# Patient Record
(demographics unavailable — no encounter records)

---

## 2024-11-06 NOTE — COUNSELING
[Fall prevention counseling provided] : Fall prevention counseling provided [Adequate lighting] : Adequate lighting [No throw rugs] : No throw rugs [Use proper foot wear] : Use proper foot wear [Use recommended devices] : Use recommended devices [Behavioral health counseling provided] : Behavioral health counseling provided [Sleep ___ hours/day] : Sleep [unfilled] hours/day [Engage in a relaxing activity] : Engage in a relaxing activity [Plan in advance] : Plan in advance [Potential consequences of obesity discussed] : Potential consequences of obesity discussed [Benefits of weight loss discussed] : Benefits of weight loss discussed [Structured Weight Management Program suggested:] : Structured weight management program suggested [Encouraged to maintain food diary] : Encouraged to maintain food diary [Encouraged to increase physical activity] : Encouraged to increase physical activity [Target Wt Loss Goal ___] : Weight Loss Goals: Target weight loss goal [unfilled] lbs [Weigh Self Weekly] : weigh self weekly [Decrease Portions] : decrease portions [____ min/wk Activity] : [unfilled] min/wk activity [None] : None [Good understanding] : Patient has a good understanding of lifestyle changes and steps needed to achieve self management goal [de-identified] : Total face-to-face time with patient - 20 minutes; >50% involved counselling, review of labs/tests, and/or coordination of medical care:  Medical Annual wellness visit completed: HRA completed and reviewed with patient Medical, family, surgical history reviewed with patient and updated List of current providers r/w patient and updated Vitals, BMI reviewed and discussed along with healthy BMI goals. Dietary counseling x 15 minutes provided Depression PHQ 9 completed and reviewed  Annual safety assessment reviewed discussed advanced directives smoking cessation counseling provided Established routine screening and immunization schedules Medical Annual wellness visit completed: HRA completed and reviewed with patient Medical, family, surgical history reviewed with patient and updated List of current providers r/w patient and updated Vitals, BMI reviewed and discussed along with healthy BMI goals. Dietary counseling x 15 minutes provided Depression PHQ 9 completed and reviewed  Annual safety assessment reviewed discussed advanced directives smoking cessation counseling provided Established routine screening and immunization schedules  VACCINATION & OTHER TX RECOMMENDATIONS  ASA preventative therapy Calcium/Vitamin D supplementation   Dietary counseling, nutrition referral risks vs. benefits d/w patient. routine vaccination and vaccination schedules and recommendation d/w patient  Vaccines recommended:  * pneumovax (once after 65) & prevnar * annual Influenza vaccine * Hep B vaccines * zostavax * Tdap  Colorectal screening recommended; screening colonoscopy q10yr, flex sig q5yr, annual fecal occult testing BMD recommended biennially for osteoporosis screening Glaucoma screening recommended, annual optho evals Cardiovascular screening and blood tests recommended and discussed w/ patient, cholesterol screening and dietary counseling AAA recommended x 1  DIABETIC recommendations: med nutrition counseling, nutrition referral  annual podiatry evaluations and follow up annual optho eval/retinal exams routine blood tets, including FBS, a1c% and cholesterol panels  Met with HANNAH CHAVEZ, who was willing to discuss advance care planning.  Our advance care planning conversation included a discussion about: 1. The value and importance of advance care planning. 2. Experiences with loved ones who have been seriously ill or have . 3. Exploration of personal, cultural, or spiritual beliefs that might influence medical decisions. 4. Exploration of goals of care in the event of a sudden injury or illness. 5. Identification of a health care agent. 6. Review and update, or completion of, an advance directive. Start time: ____________                    End time: ____________  Bp stable, continue with medications, dash diet, exercise, and dietary management.Continue to check home Bps.  Avoid sweets and sugary drinks, diet drinks or water preferred, minimize carbohydrates such as bread, rice, pasta and potatoes - Discussed diabetes physiology - Discussed importance of monitoring blood glucose levels - Encouraged a low fat/low cholesterol diet - Discussed symptoms of hyperglycemia and hypoglycemia - Discussed ADA glucose goals - Discussed  HGB A1c and the effects of blood glucose on the level - Discussed Healthy eating, avoidance of concentrated sweets, and to include vegetables by at least 2 meals a day - Discussed regular exercise - Discussed importance of follow up physician visits  Avoid tomato products, mint, citrus (drinks / fruit), fried fatty foods, caffeine, chocolate.Avoid food / drink ,2 hours prior to bedtime / napping:

## 2024-11-06 NOTE — PHYSICAL EXAM
[No Acute Distress] : no acute distress [Well Nourished] : well nourished [Well Developed] : well developed [Well-Appearing] : well-appearing [Normal Sclera/Conjunctiva] : normal sclera/conjunctiva [PERRL] : pupils equal round and reactive to light [EOMI] : extraocular movements intact [Normal Outer Ear/Nose] : the outer ears and nose were normal in appearance [Normal Oropharynx] : the oropharynx was normal [No JVD] : no jugular venous distention [No Lymphadenopathy] : no lymphadenopathy [Supple] : supple [Thyroid Normal, No Nodules] : the thyroid was normal and there were no nodules present [No Respiratory Distress] : no respiratory distress  [No Accessory Muscle Use] : no accessory muscle use [Clear to Auscultation] : lungs were clear to auscultation bilaterally [Normal Rate] : normal rate  [Regular Rhythm] : with a regular rhythm [Normal S1, S2] : normal S1 and S2 [No Murmur] : no murmur heard [No Carotid Bruits] : no carotid bruits [No Abdominal Bruit] : a ~M bruit was not heard ~T in the abdomen [No Varicosities] : no varicosities [Pedal Pulses Present] : the pedal pulses are present [No Edema] : there was no peripheral edema [No Palpable Aorta] : no palpable aorta [No Extremity Clubbing/Cyanosis] : no extremity clubbing/cyanosis [Declined Breast Exam] : declined breast exam  [Soft] : abdomen soft [Non Tender] : non-tender [Non-distended] : non-distended [No Masses] : no abdominal mass palpated [No HSM] : no HSM [Normal Bowel Sounds] : normal bowel sounds [Declined Rectal Exam] : declined rectal exam [Normal Posterior Cervical Nodes] : no posterior cervical lymphadenopathy [Normal Anterior Cervical Nodes] : no anterior cervical lymphadenopathy [No CVA Tenderness] : no CVA  tenderness [No Spinal Tenderness] : no spinal tenderness [No Joint Swelling] : no joint swelling [Grossly Normal Strength/Tone] : grossly normal strength/tone [No Rash] : no rash [Coordination Grossly Intact] : coordination grossly intact [No Focal Deficits] : no focal deficits [Normal Gait] : normal gait [Deep Tendon Reflexes (DTR)] : deep tendon reflexes were 2+ and symmetric [Normal Affect] : the affect was normal [Normal Insight/Judgement] : insight and judgment were intact [Normal] : affect was normal and insight and judgment were intact [de-identified] : BMI 28 [de-identified] : No do formational swelling noted in fingers but patient has inability to close his hands [de-identified] : Zoster scars noted on right posterior hemithorax

## 2024-11-06 NOTE — REASON FOR VISIT
[Annual Wellness Visit] : an annual wellness visit [FreeTextEntry1] : Annual wellness for this 76-year-old male

## 2024-11-06 NOTE — HISTORY OF PRESENT ILLNESS
[FreeTextEntry1] : 77-year-old male with history of COPD, elevated PSA GERD glucose intolerance hypertension history of herpes zoster which was prolonged for months..  Patient continues to use oxygen for COPD, desaturates when off of O2 with exertion.  Is stable at rest.  Patient is complaining of left hip pain.  Denies fever chills chest pain or palpitations [de-identified] : 77-year-old male presents himself today for an Annual Wellness,F/U and GHM. Hx of COPD, GERD, HTN  This 77-year-old male with COPD comes in for annual checkup.  Needs refills of medications.  Complaining of hip pain and knee pain. Voices no further complaints. ROS as documented below. Denies fever,cough,chills,body aches and SOB.

## 2024-11-06 NOTE — REVIEW OF SYSTEMS
[Negative] : Heme/Lymph [Dyspnea on Exertion] : dyspnea on exertion [Joint Pain] : joint pain [Joint Stiffness] : joint stiffness [Muscle Pain] : muscle pain [FreeTextEntry9] : Hip pain left

## 2024-11-06 NOTE — END OF VISIT
[FreeTextEntry4] : I Linda Mascorro, am scribing for and in the presence of Dr. Bland, the following sections of:  HISTORY OF PRESENT ILLNESS, PAST MEDICAL/FAMILY/SOCIAL HISTORY, ROS, VITALS, PE, DISPOSITION [Time Spent: ___ minutes] : I have spent [unfilled] minutes of time on the encounter which excludes teaching and separately reported services.

## 2024-11-06 NOTE — DATA REVIEWED
[FreeTextEntry1] : Rheumatoid factor negative C-reactive protein 6 sed rate 17.  A1c 5.7, PSA 4.84 EKG normal sinus rhythm.  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

## 2024-11-06 NOTE — HEALTH RISK ASSESSMENT
[Fair] :  ~his/her~ mood as fair [1 or 2 (0 pts)] : 1 or 2 (0 points) [Never (0 pts)] : Never (0 points) [No] : In the past 12 months have you used drugs other than those required for medical reasons? No [No falls in past year] : Patient reported no falls in the past year [0] : 2) Feeling down, depressed, or hopeless: Not at all (0) [PHQ-2 Negative - No further assessment needed] : PHQ-2 Negative - No further assessment needed [PHQ-9 Negative - No further assessment needed] : PHQ-9 Negative - No further assessment needed [Former] : Former [Patient reported colonoscopy was normal] : Patient reported colonoscopy was normal [None] : None [With Significant Other] : lives with significant other [# of Members in Household ___] :  household currently consist of [unfilled] member(s) [Retired] : retired [High School] : high school [] :  [# Of Children ___] : has [unfilled] children [Feels Safe at Home] : Feels safe at home [Fully functional (bathing, dressing, toileting, transferring, walking, feeding)] : Fully functional (bathing, dressing, toileting, transferring, walking, feeding) [Fully functional (using the telephone, shopping, preparing meals, housekeeping, doing laundry, using] : Fully functional and needs no help or supervision to perform IADLs (using the telephone, shopping, preparing meals, housekeeping, doing laundry, using transportation, managing medications and managing finances) [Reports normal functional visual acuity (ie: able to read med bottle)] : Reports normal functional visual acuity [Smoke Detector] : smoke detector [Carbon Monoxide Detector] : carbon monoxide detector [Safety elements used in home] : safety elements used in home [Seat Belt] :  uses seat belt [Sunscreen] : uses sunscreen [Designated Healthcare Proxy] : Designated healthcare proxy [Name: ___] : Health Care Proxy's Name: [unfilled]  [Relationship: ___] : Relationship: [unfilled] [Aggressive treatment] : aggressive treatment [I will adhere to the patient's wishes.] : I will adhere to the patient's wishes. [Time Spent: ___ minutes] : Time Spent: [unfilled] minutes [Audit-CScore] : 0 [de-identified] : walking  [de-identified] : everything, does not watch his diet  [de-identified] : PHQ9 5 minutes spent  [[id number]] : 0 [LowDoseCTScan] : 04/23 [EyeExamDate] : 10/22 [Change in mental status noted] : No change in mental status noted [Language] : denies difficulty with language [Behavior] : denies difficulty with behavior [Learning/Retaining New Information] : denies difficulty learning/retaining new information [Handling Complex Tasks] : denies difficulty handling complex tasks [Reasoning] : denies difficulty with reasoning [Sexually Active] : not sexually active [Reports changes in hearing] : Reports no changes in hearing [Reports changes in dental health] : Reports no changes in dental health [Reports changes in vision] : Reports no changes in vision [Travel to Developing Areas] : does not  travel to developing areas [TB Exposure] : is not being exposed to tuberculosis [ColonoscopyDate] : 10/13 [de-identified] : decrease in hearing  [AdvancecareDate] : 11/24 [FreeTextEntry4] : [phone]

## 2024-11-06 NOTE — HEALTH RISK ASSESSMENT
[Fair] :  ~his/her~ mood as fair [1 or 2 (0 pts)] : 1 or 2 (0 points) [Never (0 pts)] : Never (0 points) [No] : In the past 12 months have you used drugs other than those required for medical reasons? No [No falls in past year] : Patient reported no falls in the past year [0] : 2) Feeling down, depressed, or hopeless: Not at all (0) [PHQ-2 Negative - No further assessment needed] : PHQ-2 Negative - No further assessment needed [PHQ-9 Negative - No further assessment needed] : PHQ-9 Negative - No further assessment needed [Former] : Former [Patient reported colonoscopy was normal] : Patient reported colonoscopy was normal [None] : None [With Significant Other] : lives with significant other [# of Members in Household ___] :  household currently consist of [unfilled] member(s) [Retired] : retired [High School] : high school [] :  [# Of Children ___] : has [unfilled] children [Feels Safe at Home] : Feels safe at home [Fully functional (bathing, dressing, toileting, transferring, walking, feeding)] : Fully functional (bathing, dressing, toileting, transferring, walking, feeding) [Fully functional (using the telephone, shopping, preparing meals, housekeeping, doing laundry, using] : Fully functional and needs no help or supervision to perform IADLs (using the telephone, shopping, preparing meals, housekeeping, doing laundry, using transportation, managing medications and managing finances) [Reports normal functional visual acuity (ie: able to read med bottle)] : Reports normal functional visual acuity [Smoke Detector] : smoke detector [Carbon Monoxide Detector] : carbon monoxide detector [Safety elements used in home] : safety elements used in home [Seat Belt] :  uses seat belt [Sunscreen] : uses sunscreen [Designated Healthcare Proxy] : Designated healthcare proxy [Name: ___] : Health Care Proxy's Name: [unfilled]  [Relationship: ___] : Relationship: [unfilled] [Aggressive treatment] : aggressive treatment [I will adhere to the patient's wishes.] : I will adhere to the patient's wishes. [Time Spent: ___ minutes] : Time Spent: [unfilled] minutes [Audit-CScore] : 0 [de-identified] : walking  [de-identified] : everything, does not watch his diet  [de-identified] : PHQ9 5 minutes spent  [CLI1Vixue] : 0 [LowDoseCTScan] : 04/23 [EyeExamDate] : 10/22 [Change in mental status noted] : No change in mental status noted [Language] : denies difficulty with language [Behavior] : denies difficulty with behavior [Learning/Retaining New Information] : denies difficulty learning/retaining new information [Handling Complex Tasks] : denies difficulty handling complex tasks [Reasoning] : denies difficulty with reasoning [Sexually Active] : not sexually active [Reports changes in hearing] : Reports no changes in hearing [Reports changes in dental health] : Reports no changes in dental health [Reports changes in vision] : Reports no changes in vision [Travel to Developing Areas] : does not  travel to developing areas [TB Exposure] : is not being exposed to tuberculosis [ColonoscopyDate] : 10/13 [de-identified] : decrease in hearing  [AdvancecareDate] : 11/24 [FreeTextEntry4] : [phone]

## 2024-11-06 NOTE — COUNSELING
[Fall prevention counseling provided] : Fall prevention counseling provided [Adequate lighting] : Adequate lighting [No throw rugs] : No throw rugs [Use proper foot wear] : Use proper foot wear [Use recommended devices] : Use recommended devices [Behavioral health counseling provided] : Behavioral health counseling provided [Sleep ___ hours/day] : Sleep [unfilled] hours/day [Engage in a relaxing activity] : Engage in a relaxing activity [Plan in advance] : Plan in advance [Potential consequences of obesity discussed] : Potential consequences of obesity discussed [Benefits of weight loss discussed] : Benefits of weight loss discussed [Structured Weight Management Program suggested:] : Structured weight management program suggested [Encouraged to maintain food diary] : Encouraged to maintain food diary [Encouraged to increase physical activity] : Encouraged to increase physical activity [Target Wt Loss Goal ___] : Weight Loss Goals: Target weight loss goal [unfilled] lbs [Weigh Self Weekly] : weigh self weekly [Decrease Portions] : decrease portions [____ min/wk Activity] : [unfilled] min/wk activity [None] : None [Good understanding] : Patient has a good understanding of lifestyle changes and steps needed to achieve self management goal [de-identified] : Total face-to-face time with patient - 20 minutes; >50% involved counselling, review of labs/tests, and/or coordination of medical care:  Medical Annual wellness visit completed: HRA completed and reviewed with patient Medical, family, surgical history reviewed with patient and updated List of current providers r/w patient and updated Vitals, BMI reviewed and discussed along with healthy BMI goals. Dietary counseling x 15 minutes provided Depression PHQ 9 completed and reviewed  Annual safety assessment reviewed discussed advanced directives smoking cessation counseling provided Established routine screening and immunization schedules Medical Annual wellness visit completed: HRA completed and reviewed with patient Medical, family, surgical history reviewed with patient and updated List of current providers r/w patient and updated Vitals, BMI reviewed and discussed along with healthy BMI goals. Dietary counseling x 15 minutes provided Depression PHQ 9 completed and reviewed  Annual safety assessment reviewed discussed advanced directives smoking cessation counseling provided Established routine screening and immunization schedules  VACCINATION & OTHER TX RECOMMENDATIONS  ASA preventative therapy Calcium/Vitamin D supplementation   Dietary counseling, nutrition referral risks vs. benefits d/w patient. routine vaccination and vaccination schedules and recommendation d/w patient  Vaccines recommended:  * pneumovax (once after 65) & prevnar * annual Influenza vaccine * Hep B vaccines * zostavax * Tdap  Colorectal screening recommended; screening colonoscopy q10yr, flex sig q5yr, annual fecal occult testing BMD recommended biennially for osteoporosis screening Glaucoma screening recommended, annual optho evals Cardiovascular screening and blood tests recommended and discussed w/ patient, cholesterol screening and dietary counseling AAA recommended x 1  DIABETIC recommendations: med nutrition counseling, nutrition referral  annual podiatry evaluations and follow up annual optho eval/retinal exams routine blood tets, including FBS, a1c% and cholesterol panels  Met with HANNAH CHAVEZ, who was willing to discuss advance care planning.  Our advance care planning conversation included a discussion about: 1. The value and importance of advance care planning. 2. Experiences with loved ones who have been seriously ill or have . 3. Exploration of personal, cultural, or spiritual beliefs that might influence medical decisions. 4. Exploration of goals of care in the event of a sudden injury or illness. 5. Identification of a health care agent. 6. Review and update, or completion of, an advance directive. Start time: ____________                    End time: ____________  Bp stable, continue with medications, dash diet, exercise, and dietary management.Continue to check home Bps.  Avoid sweets and sugary drinks, diet drinks or water preferred, minimize carbohydrates such as bread, rice, pasta and potatoes - Discussed diabetes physiology - Discussed importance of monitoring blood glucose levels - Encouraged a low fat/low cholesterol diet - Discussed symptoms of hyperglycemia and hypoglycemia - Discussed ADA glucose goals - Discussed  HGB A1c and the effects of blood glucose on the level - Discussed Healthy eating, avoidance of concentrated sweets, and to include vegetables by at least 2 meals a day - Discussed regular exercise - Discussed importance of follow up physician visits  Avoid tomato products, mint, citrus (drinks / fruit), fried fatty foods, caffeine, chocolate.Avoid food / drink ,2 hours prior to bedtime / napping:

## 2024-11-06 NOTE — HISTORY OF PRESENT ILLNESS
[FreeTextEntry1] : 77-year-old male with history of COPD, elevated PSA GERD glucose intolerance hypertension history of herpes zoster which was prolonged for months..  Patient continues to use oxygen for COPD, desaturates when off of O2 with exertion.  Is stable at rest.  Patient is complaining of left hip pain.  Denies fever chills chest pain or palpitations [de-identified] : 77-year-old male presents himself today for an Annual Wellness,F/U and GHM. Hx of COPD, GERD, HTN  This 77-year-old male with COPD comes in for annual checkup.  Needs refills of medications.  Complaining of hip pain and knee pain. Voices no further complaints. ROS as documented below. Denies fever,cough,chills,body aches and SOB.

## 2024-11-06 NOTE — PHYSICAL EXAM
[No Acute Distress] : no acute distress [Well Nourished] : well nourished [Well Developed] : well developed [Well-Appearing] : well-appearing [Normal Sclera/Conjunctiva] : normal sclera/conjunctiva [PERRL] : pupils equal round and reactive to light [EOMI] : extraocular movements intact [Normal Outer Ear/Nose] : the outer ears and nose were normal in appearance [Normal Oropharynx] : the oropharynx was normal [No JVD] : no jugular venous distention [No Lymphadenopathy] : no lymphadenopathy [Supple] : supple [Thyroid Normal, No Nodules] : the thyroid was normal and there were no nodules present [No Respiratory Distress] : no respiratory distress  [No Accessory Muscle Use] : no accessory muscle use [Clear to Auscultation] : lungs were clear to auscultation bilaterally [Normal Rate] : normal rate  [Regular Rhythm] : with a regular rhythm [Normal S1, S2] : normal S1 and S2 [No Murmur] : no murmur heard [No Carotid Bruits] : no carotid bruits [No Abdominal Bruit] : a ~M bruit was not heard ~T in the abdomen [No Varicosities] : no varicosities [Pedal Pulses Present] : the pedal pulses are present [No Edema] : there was no peripheral edema [No Palpable Aorta] : no palpable aorta [No Extremity Clubbing/Cyanosis] : no extremity clubbing/cyanosis [Declined Breast Exam] : declined breast exam  [Soft] : abdomen soft [Non Tender] : non-tender [Non-distended] : non-distended [No Masses] : no abdominal mass palpated [No HSM] : no HSM [Normal Bowel Sounds] : normal bowel sounds [Declined Rectal Exam] : declined rectal exam [Normal Posterior Cervical Nodes] : no posterior cervical lymphadenopathy [Normal Anterior Cervical Nodes] : no anterior cervical lymphadenopathy [No CVA Tenderness] : no CVA  tenderness [No Spinal Tenderness] : no spinal tenderness [No Joint Swelling] : no joint swelling [Grossly Normal Strength/Tone] : grossly normal strength/tone [No Rash] : no rash [Coordination Grossly Intact] : coordination grossly intact [No Focal Deficits] : no focal deficits [Normal Gait] : normal gait [Deep Tendon Reflexes (DTR)] : deep tendon reflexes were 2+ and symmetric [Normal Affect] : the affect was normal [Normal Insight/Judgement] : insight and judgment were intact [Normal] : affect was normal and insight and judgment were intact [de-identified] : BMI 28 [de-identified] : No do formational swelling noted in fingers but patient has inability to close his hands [de-identified] : Zoster scars noted on right posterior hemithorax

## 2025-01-28 NOTE — HEALTH RISK ASSESSMENT
[Yes] : Yes [No falls in past year] : Patient reported no falls in the past year [0] : 2) Feeling down, depressed, or hopeless: Not at all (0) [PHQ-2 Negative - No further assessment needed] : PHQ-2 Negative - No further assessment needed [Reviewed no changes] : Reviewed, no changes [I will adhere to the patient's wishes.] : I will adhere to the patient's wishes. [Never] : Never [de-identified] : Pulm  [de-identified] : Minimal [de-identified] : Standard

## 2025-01-28 NOTE — HISTORY OF PRESENT ILLNESS
[FreeTextEntry1] :  77-year-old male comes to the office after hospitalization for pneumonia and COPD with respiratory failure.  Patient is on high doses of steroids starting at 60 and weaning down.  States that he feels very anxious. while taking them, he does use oxygen 24/7 for respiratory failure.he also has a history of chronic sinusitis, elevated PSA GERD glucose intolerance hypertension COPD and now adjustment disorder with anxiety.  Patient also complaining of some pedal edema left greater than right [de-identified] : 78 y/o M presents for a follow up, JENNIFER. Hx of COPD, GERD, HTN.  Status post pneumonia.  Complaining of nasal congestion with shortness of breath.  On exertion. uses oxygen 24/7..  Patient feels anxious and jittery with steroids.... Palpitations denies fever chills admits to occasional coughing and exertional dyspnea Medications include albuterol and prednisone gabapentin pantoprazole Spiriva. Voices no further complaints. ROS as documented below. Denies fever,cough,chills,body aches and SOB.

## 2025-01-28 NOTE — HISTORY OF PRESENT ILLNESS
[FreeTextEntry1] :  77-year-old male comes to the office after hospitalization for pneumonia and COPD with respiratory failure.  Patient is on high doses of steroids starting at 60 and weaning down.  States that he feels very anxious. while taking them, he does use oxygen 24/7 for respiratory failure.he also has a history of chronic sinusitis, elevated PSA GERD glucose intolerance hypertension COPD and now adjustment disorder with anxiety.  Patient also complaining of some pedal edema left greater than right [de-identified] : 78 y/o M presents for a follow up, JENNIFER. Hx of COPD, GERD, HTN.  Status post pneumonia.  Complaining of nasal congestion with shortness of breath.  On exertion. uses oxygen 24/7..  Patient feels anxious and jittery with steroids.... Palpitations denies fever chills admits to occasional coughing and exertional dyspnea Medications include albuterol and prednisone gabapentin pantoprazole Spiriva. Voices no further complaints. ROS as documented below. Denies fever,cough,chills,body aches and SOB.

## 2025-01-28 NOTE — END OF VISIT
[FreeTextEntry4] : I Linda Mascorro, am scribing for and in the presence of Dr. Bland, the following sections of:  HISTORY OF PRESENT ILLNESS, PAST MEDICAL/FAMILY/SOCIAL HISTORY, ROS, VITALS, PE, DISPOSITION

## 2025-01-28 NOTE — HEALTH RISK ASSESSMENT
[Yes] : Yes [No falls in past year] : Patient reported no falls in the past year [0] : 2) Feeling down, depressed, or hopeless: Not at all (0) [PHQ-2 Negative - No further assessment needed] : PHQ-2 Negative - No further assessment needed [Reviewed no changes] : Reviewed, no changes [I will adhere to the patient's wishes.] : I will adhere to the patient's wishes. [Never] : Never [de-identified] : Pulm  [de-identified] : Minimal [de-identified] : Standard

## 2025-01-28 NOTE — REVIEW OF SYSTEMS
[Negative] : Heme/Lymph [Dyspnea on Exertion] : dyspnea on exertion [Anxiety] : anxiety [FreeTextEntry4] : Nasal congestion

## 2025-01-28 NOTE — COUNSELING
[Fall prevention counseling provided] : Fall prevention counseling provided [Adequate lighting] : Adequate lighting [No throw rugs] : No throw rugs [Use proper foot wear] : Use proper foot wear [Use recommended devices] : Use recommended devices [Behavioral health counseling provided] : Behavioral health counseling provided [Sleep ___ hours/day] : Sleep [unfilled] hours/day [Engage in a relaxing activity] : Engage in a relaxing activity [Plan in advance] : Plan in advance [None] : None [Good understanding] : Patient has a good understanding of lifestyle changes and steps needed to achieve self management goal [de-identified] : Total face-to-face time with patient - 15 minutes; >50% involved counselling, review of labs/tests, and/or coordination of medical care: diet and exercise weight loss.  Low-salt low-fat ADA diet/ htn- Discussed diabetes physiology - Discussed importance of monitoring blood glucose levels - Encouraged a low fat/low cholesterol diet - Discussed symptoms of hyperglycemia and hypoglycemia - Discussed ADA glucose goals - Discussed  HGB A1c and the effects of blood glucose on the level - Discussed Healthy eating, avoidance of concentrated sweets, and to include vegetables by at least 2 meals a day - Discussed regular exercise - Discussed importance of follow up physician visits Limit intake of Sodium (Salt) to less than 2 grams a day to prevent fluid retention-swelling or worsening of symptoms. The importance of keeping the blood pressure at or below 130/80 to prevent stroke, heart attacks, kidney failure, blindness, and loss of limbs was  low chol diet. Avoid fried foods, red meat, butter, eggs, hard cheeses. Use canola or olive oil preferred. ::  was established in which goals would be set, monitoring would be done, and problem solving would also be addressed. The patient would be assisted using behavior change techniques, such as self-help and counseling through behavioral modification: Problem solving using hypnosis and positive medical reinforcement to achieve agreed-upon goals.  Symptomatic patients : Test for influenza, if positive, treat for influenza and do not continue below.  1. Fever plus cough or shortness of breath : Test for RVP and COVID-19. 2.Indirect, circumstantial or unclear exposure to COVID-19, or other concerning cases not meeting above criteria: Please call [facility] to discuss testing.  +++ All above cases must be reported to the [facility] registry. +++  Asymptomatic patients:  1. Known first-degree direct-contact exposure to positive COVID-19 patient but asymptomatic: No testing PLUS 14 day self-quarantine. Pt to call if symptoms develop. Report to [facility]. 2. No known exposure and asymptomatic, referred from outside healthcare organization: Please call [facility] to discuss testing.  3.All other asymptomatic patients with no known exposures: no testing, no exceptions.  diet and exercise weight loss.  Low-salt low-fat ADA diet/ htn- Discussed diabetes physiology - Discussed importance of monitoring blood glucose levels - Encouraged a low fat/low cholesterol diet - Discussed symptoms of hyperglycemia and hypoglycemia - Discussed ADA glucose goals - Discussed  HGB A1c and the effects of blood glucose on the level - Discussed Healthy eating, avoidance of concentrated sweets, and to include vegetables by at least 2 meals a day - Discussed regular exercise - Discussed importance of follow up physician visits Limit intake of Sodium (Salt) to less than 2 grams a day to prevent fluid retention-swelling or worsening of symptoms. The importance of keeping the blood pressure at or below 130/80 to prevent stroke, heart attacks, kidney failure, blindness, and loss of limbs was  low chol diet. Avoid fried foods, red meat, butter, eggs, hard cheeses. Use canola or olive oil preferred. ::  was established in which goals would be set, monitoring would be done, and problem solving would also be addressed. The patient would be assisted using behavior change techniques, such as self-help and counseling through behavioral modification: Problem solving using hypnosis and positive medical reinforcement to achieve agreed-upon goals.

## 2025-01-28 NOTE — COUNSELING
[Fall prevention counseling provided] : Fall prevention counseling provided [Adequate lighting] : Adequate lighting [No throw rugs] : No throw rugs [Use proper foot wear] : Use proper foot wear [Use recommended devices] : Use recommended devices [Behavioral health counseling provided] : Behavioral health counseling provided [Sleep ___ hours/day] : Sleep [unfilled] hours/day [Engage in a relaxing activity] : Engage in a relaxing activity [Plan in advance] : Plan in advance [None] : None [Good understanding] : Patient has a good understanding of lifestyle changes and steps needed to achieve self management goal [de-identified] : Total face-to-face time with patient - 15 minutes; >50% involved counselling, review of labs/tests, and/or coordination of medical care: diet and exercise weight loss.  Low-salt low-fat ADA diet/ htn- Discussed diabetes physiology - Discussed importance of monitoring blood glucose levels - Encouraged a low fat/low cholesterol diet - Discussed symptoms of hyperglycemia and hypoglycemia - Discussed ADA glucose goals - Discussed  HGB A1c and the effects of blood glucose on the level - Discussed Healthy eating, avoidance of concentrated sweets, and to include vegetables by at least 2 meals a day - Discussed regular exercise - Discussed importance of follow up physician visits Limit intake of Sodium (Salt) to less than 2 grams a day to prevent fluid retention-swelling or worsening of symptoms. The importance of keeping the blood pressure at or below 130/80 to prevent stroke, heart attacks, kidney failure, blindness, and loss of limbs was  low chol diet. Avoid fried foods, red meat, butter, eggs, hard cheeses. Use canola or olive oil preferred. ::  was established in which goals would be set, monitoring would be done, and problem solving would also be addressed. The patient would be assisted using behavior change techniques, such as self-help and counseling through behavioral modification: Problem solving using hypnosis and positive medical reinforcement to achieve agreed-upon goals.  Symptomatic patients : Test for influenza, if positive, treat for influenza and do not continue below.  1. Fever plus cough or shortness of breath : Test for RVP and COVID-19. 2.Indirect, circumstantial or unclear exposure to COVID-19, or other concerning cases not meeting above criteria: Please call [facility] to discuss testing.  +++ All above cases must be reported to the [facility] registry. +++  Asymptomatic patients:  1. Known first-degree direct-contact exposure to positive COVID-19 patient but asymptomatic: No testing PLUS 14 day self-quarantine. Pt to call if symptoms develop. [facility] to [facility]. 2. No known exposure and asymptomatic, referred from outside healthcare organization: Please call [facility] to discuss testing.  3.All other asymptomatic patients with no known exposures: no testing, no exceptions.  diet and exercise weight loss.  Low-salt low-fat ADA diet/ htn- Discussed diabetes physiology - Discussed importance of monitoring blood glucose levels - Encouraged a low fat/low cholesterol diet - Discussed symptoms of hyperglycemia and hypoglycemia - Discussed ADA glucose goals - Discussed  HGB A1c and the effects of blood glucose on the level - Discussed Healthy eating, avoidance of concentrated sweets, and to include vegetables by at least 2 meals a day - Discussed regular exercise - Discussed importance of follow up physician visits Limit intake of Sodium (Salt) to less than 2 grams a day to prevent fluid retention-swelling or worsening of symptoms. The importance of keeping the blood pressure at or below 130/80 to prevent stroke, heart attacks, kidney failure, blindness, and loss of limbs was  low chol diet. Avoid fried foods, red meat, butter, eggs, hard cheeses. Use canola or olive oil preferred. ::  was established in which goals would be set, monitoring would be done, and problem solving would also be addressed. The patient would be assisted using behavior change techniques, such as self-help and counseling through behavioral modification: Problem solving using hypnosis and positive medical reinforcement to achieve agreed-upon goals.